# Patient Record
Sex: FEMALE | Race: WHITE | Employment: OTHER | URBAN - NONMETROPOLITAN AREA
[De-identification: names, ages, dates, MRNs, and addresses within clinical notes are randomized per-mention and may not be internally consistent; named-entity substitution may affect disease eponyms.]

---

## 2017-04-05 ENCOUNTER — DOCTOR'S OFFICE (OUTPATIENT)
Dept: URBAN - NONMETROPOLITAN AREA CLINIC 1 | Facility: CLINIC | Age: 82
Setting detail: OPHTHALMOLOGY
End: 2017-04-05
Payer: COMMERCIAL

## 2017-04-05 DIAGNOSIS — H35.09: ICD-10-CM

## 2017-04-05 DIAGNOSIS — H40.1132: ICD-10-CM

## 2017-04-05 DIAGNOSIS — Z96.1: ICD-10-CM

## 2017-04-05 PROCEDURE — 92250 FUNDUS PHOTOGRAPHY W/I&R: CPT | Performed by: OPHTHALMOLOGY

## 2017-04-05 PROCEDURE — 92014 COMPRE OPH EXAM EST PT 1/>: CPT | Performed by: OPHTHALMOLOGY

## 2017-04-05 PROCEDURE — 92133 CPTRZD OPH DX IMG PST SGM ON: CPT | Performed by: OPHTHALMOLOGY

## 2017-04-05 ASSESSMENT — REFRACTION_CURRENTRX
OS_ADD: +2.75
OS_VPRISM_DIRECTION: PROGS
OS_AXIS: 075
OS_OVR_VA: 20/
OD_CYLINDER: -0.50
OD_AXIS: 70
OS_SPHERE: PLANO
OS_OVR_VA: 20/
OD_VPRISM_DIRECTION: PROGS
OS_OVR_VA: 20/
OD_AXIS: 070
OS_ADD: +2.75
OS_CYLINDER: -*0.75
OD_OVR_VA: 20/
OD_SPHERE: PLANO
OS_VPRISM_DIRECTION: BF
OD_OVR_VA: 20/
OS_CYLINDER: -0.75
OD_CYLINDER: -0.75
OD_SPHERE: PLANO
OD_OVR_VA: 20/
OD_VPRISM_DIRECTION: BF
OS_AXIS: 65
OS_SPHERE: PLANO
OD_ADD: +2.75
OD_ADD: +2.75

## 2017-04-05 ASSESSMENT — CONFRONTATIONAL VISUAL FIELD TEST (CVF)
OS_FINDINGS: FULL
OD_FINDINGS: FULL

## 2017-04-05 ASSESSMENT — REFRACTION_MANIFEST
OD_SPHERE: PLANO
OS_VA1: 20/
OS_AXIS: 075
OD_VA2: 20/
OS_VA2: 20/
OD_VA3: 20/
OD_VA3: 20/
OD_CYLINDER: -0.75
OU_VA: 20/
OS_VA3: 20/
OU_VA: 20/
OS_VA2: 20/
OS_VA1: 20/25
OS_VA3: 20/
OD_VA2: 20/
OS_ADD: +2.75
OS_VA1: 20/
OS_VA3: 20/
OD_VA1: 20/25+2
OS_SPHERE: PLANO
OD_AXIS: 070
OD_VA1: 20/
OD_VA3: 20/
OS_VA2: 20/25
OD_VA1: 20/
OD_ADD: +2.75
OS_CYLINDER: -0.75
OU_VA: 20/
OD_VA2: 20/25+2

## 2017-04-05 ASSESSMENT — CORNEAL DYSTROPHY
OD_DYSTROPHY: BAND KERATOPATHY
OS_DYSTROPHY: BAND KERATOPATHY

## 2017-04-05 ASSESSMENT — REFRACTION_AUTOREFRACTION
OS_SPHERE: +1.50
OS_CYLINDER: -2.25
OS_AXIS: 63
OD_AXIS: 88
OD_CYLINDER: -1.25
OD_SPHERE: +0.50

## 2017-04-05 ASSESSMENT — SPHEQUIV_DERIVED
OD_SPHEQUIV: -0.125
OS_SPHEQUIV: 0.375

## 2017-04-05 ASSESSMENT — VISUAL ACUITY
OS_BCVA: 20/25
OD_BCVA: 20/30-2

## 2017-10-13 ENCOUNTER — DOCTOR'S OFFICE (OUTPATIENT)
Dept: URBAN - NONMETROPOLITAN AREA CLINIC 1 | Facility: CLINIC | Age: 82
Setting detail: OPHTHALMOLOGY
End: 2017-10-13
Payer: COMMERCIAL

## 2017-10-13 DIAGNOSIS — H35.09: ICD-10-CM

## 2017-10-13 DIAGNOSIS — H40.1132: ICD-10-CM

## 2017-10-13 DIAGNOSIS — Z96.1: ICD-10-CM

## 2017-10-13 PROCEDURE — 92134 CPTRZ OPH DX IMG PST SGM RTA: CPT | Performed by: OPHTHALMOLOGY

## 2017-10-13 PROCEDURE — 92083 EXTENDED VISUAL FIELD XM: CPT | Performed by: OPHTHALMOLOGY

## 2017-10-13 PROCEDURE — 92014 COMPRE OPH EXAM EST PT 1/>: CPT | Performed by: OPHTHALMOLOGY

## 2017-10-13 ASSESSMENT — REFRACTION_CURRENTRX
OD_SPHERE: PLANO
OD_VPRISM_DIRECTION: BF
OD_CYLINDER: -0.75
OS_SPHERE: PLANO
OS_SPHERE: PLANO
OS_VPRISM_DIRECTION: PROGS
OD_ADD: +2.75
OS_OVR_VA: 20/
OS_AXIS: 65
OD_AXIS: 070
OS_ADD: +2.75
OD_OVR_VA: 20/
OD_VPRISM_DIRECTION: PROGS
OD_OVR_VA: 20/
OS_OVR_VA: 20/
OD_CYLINDER: -0.50
OS_CYLINDER: -*0.75
OD_SPHERE: PLANO
OD_AXIS: 70
OD_ADD: +2.75
OS_CYLINDER: -0.75
OS_OVR_VA: 20/
OS_AXIS: 075
OS_ADD: +2.75
OD_OVR_VA: 20/
OS_VPRISM_DIRECTION: BF

## 2017-10-13 ASSESSMENT — REFRACTION_MANIFEST
OD_VA2: 20/25+2
OU_VA: 20/
OS_AXIS: 075
OU_VA: 20/
OS_VA3: 20/
OD_VA2: 20/
OS_VA2: 20/
OS_VA1: 20/25
OD_VA2: 20/
OS_VA1: 20/
OD_VA3: 20/
OS_VA2: 20/25
OU_VA: 20/
OD_CYLINDER: -0.75
OD_AXIS: 070
OD_VA1: 20/
OD_VA1: 20/25+2
OS_VA2: 20/
OD_SPHERE: PLANO
OD_VA3: 20/
OD_ADD: +2.75
OS_VA3: 20/
OS_CYLINDER: -0.75
OS_SPHERE: PLANO
OS_VA1: 20/
OS_VA3: 20/
OD_VA3: 20/
OD_VA1: 20/
OS_ADD: +2.75

## 2017-10-13 ASSESSMENT — VISUAL ACUITY
OS_BCVA: 20/25+2
OD_BCVA: 20/20-1

## 2017-10-13 ASSESSMENT — REFRACTION_AUTOREFRACTION
OD_AXIS: 048
OD_SPHERE: +0.75
OD_CYLINDER: -0.25
OS_SPHERE: +0.50
OS_CYLINDER: -0.25
OS_AXIS: 065

## 2017-10-13 ASSESSMENT — CORNEAL DYSTROPHY
OS_DYSTROPHY: BAND KERATOPATHY
OD_DYSTROPHY: BAND KERATOPATHY

## 2017-10-13 ASSESSMENT — CONFRONTATIONAL VISUAL FIELD TEST (CVF)
OD_FINDINGS: FULL
OS_FINDINGS: FULL

## 2017-10-13 ASSESSMENT — SPHEQUIV_DERIVED
OS_SPHEQUIV: 0.375
OD_SPHEQUIV: 0.625

## 2018-04-27 ENCOUNTER — DOCTOR'S OFFICE (OUTPATIENT)
Dept: URBAN - NONMETROPOLITAN AREA CLINIC 1 | Facility: CLINIC | Age: 83
Setting detail: OPHTHALMOLOGY
End: 2018-04-27
Payer: COMMERCIAL

## 2018-04-27 DIAGNOSIS — H35.09: ICD-10-CM

## 2018-04-27 DIAGNOSIS — Z96.1: ICD-10-CM

## 2018-04-27 DIAGNOSIS — E11.3293: ICD-10-CM

## 2018-04-27 DIAGNOSIS — H40.1132: ICD-10-CM

## 2018-04-27 DIAGNOSIS — T15.01XA: ICD-10-CM

## 2018-04-27 PROCEDURE — 92133 CPTRZD OPH DX IMG PST SGM ON: CPT | Performed by: OPHTHALMOLOGY

## 2018-04-27 PROCEDURE — 92014 COMPRE OPH EXAM EST PT 1/>: CPT | Performed by: OPHTHALMOLOGY

## 2018-04-27 PROCEDURE — 76514 ECHO EXAM OF EYE THICKNESS: CPT | Performed by: OPHTHALMOLOGY

## 2018-04-27 ASSESSMENT — SPHEQUIV_DERIVED
OS_SPHEQUIV: 0.375
OD_SPHEQUIV: 0.625

## 2018-04-27 ASSESSMENT — REFRACTION_CURRENTRX
OD_AXIS: 070
OS_OVR_VA: 20/
OD_ADD: +2.75
OS_CYLINDER: -0.75
OS_OVR_VA: 20/
OS_AXIS: 65
OS_AXIS: 075
OD_CYLINDER: -0.75
OD_ADD: +2.75
OD_CYLINDER: -0.50
OD_SPHERE: PLANO
OS_VPRISM_DIRECTION: BF
OS_ADD: +2.75
OS_SPHERE: PLANO
OS_CYLINDER: -*0.75
OD_SPHERE: PLANO
OS_ADD: +2.75
OD_OVR_VA: 20/
OS_OVR_VA: 20/
OD_OVR_VA: 20/
OD_VPRISM_DIRECTION: PROGS
OD_VPRISM_DIRECTION: BF
OS_SPHERE: PLANO
OS_VPRISM_DIRECTION: PROGS
OD_OVR_VA: 20/
OD_AXIS: 70

## 2018-04-27 ASSESSMENT — REFRACTION_MANIFEST
OD_VA3: 20/
OS_CYLINDER: -0.75
OS_VA1: 20/
OS_VA1: 20/
OD_AXIS: 070
OS_VA2: 20/
OS_VA3: 20/
OD_VA1: 20/25+2
OD_CYLINDER: -0.75
OD_VA1: 20/
OU_VA: 20/
OS_VA3: 20/
OS_VA3: 20/
OD_VA2: 20/
OU_VA: 20/
OD_SPHERE: PLANO
OS_ADD: +2.75
OD_VA3: 20/
OS_VA1: 20/25
OD_VA1: 20/
OS_VA2: 20/25
OS_SPHERE: PLANO
OD_ADD: +2.75
OD_VA2: 20/
OD_VA2: 20/25+2
OS_VA2: 20/
OD_VA3: 20/
OU_VA: 20/
OS_AXIS: 075

## 2018-04-27 ASSESSMENT — VISUAL ACUITY
OD_BCVA: 20/25-2
OS_BCVA: 20/25-2

## 2018-04-27 ASSESSMENT — CONFRONTATIONAL VISUAL FIELD TEST (CVF)
OS_FINDINGS: FULL
OD_FINDINGS: FULL

## 2018-04-27 ASSESSMENT — PACHYMETRY
OS_CT_UM: 472
OD_CT_UM: 469
OD_CT_CORRECTION: 6
OS_CT_CORRECTION: 5

## 2018-04-27 ASSESSMENT — REFRACTION_AUTOREFRACTION
OD_AXIS: 048
OS_AXIS: 065
OD_CYLINDER: -0.25
OD_SPHERE: +0.75
OS_SPHERE: +0.50
OS_CYLINDER: -0.25

## 2018-04-27 ASSESSMENT — CORNEAL DYSTROPHY
OD_DYSTROPHY: BAND KERATOPATHY
OS_DYSTROPHY: BAND KERATOPATHY

## 2018-08-22 ENCOUNTER — DOCTOR'S OFFICE (OUTPATIENT)
Dept: URBAN - NONMETROPOLITAN AREA CLINIC 1 | Facility: CLINIC | Age: 83
Setting detail: OPHTHALMOLOGY
End: 2018-08-22
Payer: COMMERCIAL

## 2018-08-22 DIAGNOSIS — E11.3293: ICD-10-CM

## 2018-08-22 DIAGNOSIS — H35.09: ICD-10-CM

## 2018-08-22 DIAGNOSIS — H40.1132: ICD-10-CM

## 2018-08-22 DIAGNOSIS — Z96.1: ICD-10-CM

## 2018-08-22 PROBLEM — T15.01XA FB CORNEA ; RIGHT EYE INITIAL ENCOUNTER: Status: RESOLVED | Noted: 2018-04-27 | Resolved: 2018-08-22

## 2018-08-22 PROCEDURE — 92014 COMPRE OPH EXAM EST PT 1/>: CPT | Performed by: OPHTHALMOLOGY

## 2018-08-22 PROCEDURE — 92250 FUNDUS PHOTOGRAPHY W/I&R: CPT | Performed by: OPHTHALMOLOGY

## 2018-08-22 ASSESSMENT — REFRACTION_MANIFEST
OU_VA: 20/
OS_VA3: 20/
OU_VA: 20/
OD_ADD: +2.75
OD_VA3: 20/
OD_CYLINDER: -0.75
OS_VA3: 20/
OS_SPHERE: PLANO
OS_VA1: 20/
OS_VA1: 20/25
OS_VA2: 20/25
OD_AXIS: 070
OD_VA1: 20/
OS_VA1: 20/
OS_VA3: 20/
OS_ADD: +2.75
OD_SPHERE: PLANO
OD_VA1: 20/
OD_VA2: 20/
OS_VA2: 20/
OD_VA3: 20/
OU_VA: 20/
OD_VA2: 20/
OS_CYLINDER: -0.75
OD_VA2: 20/25+2
OD_VA1: 20/25+2
OS_AXIS: 075
OS_VA2: 20/
OD_VA3: 20/

## 2018-08-22 ASSESSMENT — REFRACTION_CURRENTRX
OS_SPHERE: PLANO
OD_AXIS: 70
OD_CYLINDER: -0.75
OS_ADD: +2.75
OD_OVR_VA: 20/
OD_SPHERE: PLANO
OD_CYLINDER: -0.50
OD_ADD: +2.75
OS_AXIS: 075
OS_CYLINDER: -0.75
OS_SPHERE: PLANO
OS_OVR_VA: 20/
OD_OVR_VA: 20/
OS_CYLINDER: -*0.75
OD_VPRISM_DIRECTION: PROGS
OD_AXIS: 070
OD_SPHERE: PLANO
OS_VPRISM_DIRECTION: PROGS
OD_ADD: +2.75
OS_OVR_VA: 20/
OS_AXIS: 65
OD_VPRISM_DIRECTION: BF
OS_VPRISM_DIRECTION: BF
OS_OVR_VA: 20/
OS_ADD: +2.75
OD_OVR_VA: 20/

## 2018-08-22 ASSESSMENT — SPHEQUIV_DERIVED
OD_SPHEQUIV: 0.625
OS_SPHEQUIV: 0.375

## 2018-08-22 ASSESSMENT — REFRACTION_AUTOREFRACTION
OS_AXIS: 065
OD_SPHERE: +0.75
OS_CYLINDER: -0.25
OS_SPHERE: +0.50
OD_AXIS: 048
OD_CYLINDER: -0.25

## 2018-08-22 ASSESSMENT — CONFRONTATIONAL VISUAL FIELD TEST (CVF)
OD_FINDINGS: FULL
OS_FINDINGS: FULL

## 2018-08-22 ASSESSMENT — CORNEAL DYSTROPHY
OS_DYSTROPHY: BAND KERATOPATHY
OD_DYSTROPHY: BAND KERATOPATHY

## 2018-08-22 ASSESSMENT — VISUAL ACUITY
OD_BCVA: 20/30+2
OS_BCVA: 20/30-2

## 2019-04-10 ENCOUNTER — RX ONLY (RX ONLY)
Age: 84
End: 2019-04-10

## 2019-04-10 ENCOUNTER — DOCTOR'S OFFICE (OUTPATIENT)
Dept: URBAN - NONMETROPOLITAN AREA CLINIC 1 | Facility: CLINIC | Age: 84
Setting detail: OPHTHALMOLOGY
End: 2019-04-10
Payer: COMMERCIAL

## 2019-04-10 DIAGNOSIS — H35.09: ICD-10-CM

## 2019-04-10 DIAGNOSIS — E11.3293: ICD-10-CM

## 2019-04-10 DIAGNOSIS — H40.1132: ICD-10-CM

## 2019-04-10 DIAGNOSIS — Z96.1: ICD-10-CM

## 2019-04-10 DIAGNOSIS — H35.3132: ICD-10-CM

## 2019-04-10 PROCEDURE — 92083 EXTENDED VISUAL FIELD XM: CPT | Performed by: OPHTHALMOLOGY

## 2019-04-10 PROCEDURE — 92014 COMPRE OPH EXAM EST PT 1/>: CPT | Performed by: OPHTHALMOLOGY

## 2019-04-10 PROCEDURE — 92134 CPTRZ OPH DX IMG PST SGM RTA: CPT | Performed by: OPHTHALMOLOGY

## 2019-04-10 PROCEDURE — 76514 ECHO EXAM OF EYE THICKNESS: CPT | Performed by: OPHTHALMOLOGY

## 2019-04-10 ASSESSMENT — REFRACTION_MANIFEST
OD_SPHERE: PLANO
OU_VA: 20/
OS_AXIS: 075
OD_VA1: 20/
OS_VA3: 20/
OS_VA1: 20/
OD_CYLINDER: -0.75
OS_ADD: +2.75
OS_VA1: 20/25
OD_VA2: 20/25+2
OS_CYLINDER: -0.75
OD_AXIS: 070
OS_VA2: 20/
OD_VA2: 20/
OS_SPHERE: PLANO
OD_VA1: 20/25+2
OS_VA2: 20/25
OD_ADD: +2.75
OU_VA: 20/
OD_VA3: 20/
OS_VA3: 20/
OD_VA3: 20/

## 2019-04-10 ASSESSMENT — REFRACTION_CURRENTRX
OS_CYLINDER: -*0.75
OS_SPHERE: PLANO
OD_ADD: +3.00
OS_AXIS: 65
OD_OVR_VA: 20/
OD_OVR_VA: 20/
OS_VPRISM_DIRECTION: BF
OD_SPHERE: PLANO
OS_OVR_VA: 20/
OS_SPHERE: PLANO
OD_VPRISM_DIRECTION: PROGS
OD_VPRISM_DIRECTION: BF
OD_CYLINDER: -0.75
OS_AXIS: 075
OS_OVR_VA: 20/
OS_ADD: +3.00
OS_ADD: +2.75
OS_CYLINDER: -0.75
OD_OVR_VA: 20/
OD_AXIS: 71
OD_ADD: +2.75
OD_CYLINDER: -0.75
OS_OVR_VA: 20/
OS_VPRISM_DIRECTION: PROGS
OD_SPHERE: PLANO
OD_AXIS: 070

## 2019-04-10 ASSESSMENT — VISUAL ACUITY
OS_BCVA: 20/30+2
OD_BCVA: 20/30-2

## 2019-04-10 ASSESSMENT — REFRACTION_AUTOREFRACTION
OD_AXIS: 174
OD_CYLINDER: -1.00
OS_CYLINDER: -0.75
OS_AXIS: 35
OS_SPHERE: +0.50
OD_SPHERE: +0.25

## 2019-04-10 ASSESSMENT — PACHYMETRY
OS_CT_UM: 611
OS_CT_CORRECTION: -5
OD_CT_CORRECTION: -5
OD_CT_UM: 618

## 2019-04-10 ASSESSMENT — CONFRONTATIONAL VISUAL FIELD TEST (CVF)
OS_FINDINGS: FULL
OD_FINDINGS: FULL

## 2019-04-10 ASSESSMENT — CORNEAL DYSTROPHY
OD_DYSTROPHY: BAND KERATOPATHY
OS_DYSTROPHY: BAND KERATOPATHY

## 2019-04-10 ASSESSMENT — SPHEQUIV_DERIVED
OD_SPHEQUIV: -0.25
OS_SPHEQUIV: 0.125

## 2019-04-26 ENCOUNTER — DOCTOR'S OFFICE (OUTPATIENT)
Dept: URBAN - NONMETROPOLITAN AREA CLINIC 1 | Facility: CLINIC | Age: 84
Setting detail: OPHTHALMOLOGY
End: 2019-04-26
Payer: COMMERCIAL

## 2019-04-26 DIAGNOSIS — H02.051: ICD-10-CM

## 2019-04-26 DIAGNOSIS — H10.503: ICD-10-CM

## 2019-04-26 DIAGNOSIS — H40.1132: ICD-10-CM

## 2019-04-26 DIAGNOSIS — H02.052: ICD-10-CM

## 2019-04-26 PROCEDURE — 67820 REVISE EYELASHES: CPT | Performed by: OPHTHALMOLOGY

## 2019-04-26 PROCEDURE — 92012 INTRM OPH EXAM EST PATIENT: CPT | Performed by: OPHTHALMOLOGY

## 2019-04-26 ASSESSMENT — REFRACTION_MANIFEST
OS_ADD: +2.75
OS_VA3: 20/
OD_AXIS: 070
OU_VA: 20/
OD_VA2: 20/
OD_VA1: 20/25+2
OS_VA2: 20/25
OD_VA3: 20/
OS_VA2: 20/
OU_VA: 20/
OS_SPHERE: PLANO
OS_VA1: 20/
OD_VA3: 20/
OS_VA1: 20/25
OD_ADD: +2.75
OD_VA1: 20/
OS_AXIS: 075
OD_CYLINDER: -0.75
OD_SPHERE: PLANO
OS_VA3: 20/
OS_CYLINDER: -0.75
OD_VA2: 20/25+2

## 2019-04-26 ASSESSMENT — REFRACTION_CURRENTRX
OD_ADD: +3.00
OD_AXIS: 71
OD_ADD: +2.75
OS_ADD: +3.00
OS_OVR_VA: 20/
OD_OVR_VA: 20/
OD_SPHERE: PLANO
OD_CYLINDER: -0.75
OS_SPHERE: PLANO
OS_OVR_VA: 20/
OD_OVR_VA: 20/
OD_SPHERE: PLANO
OS_VPRISM_DIRECTION: BF
OD_OVR_VA: 20/
OS_CYLINDER: -0.75
OD_CYLINDER: -0.75
OD_AXIS: 070
OD_VPRISM_DIRECTION: PROGS
OS_OVR_VA: 20/
OS_VPRISM_DIRECTION: PROGS
OS_AXIS: 075
OS_CYLINDER: -*0.75
OS_SPHERE: PLANO
OD_VPRISM_DIRECTION: BF
OS_ADD: +2.75
OS_AXIS: 65

## 2019-04-26 ASSESSMENT — CONFRONTATIONAL VISUAL FIELD TEST (CVF)
OD_FINDINGS: FULL
OS_FINDINGS: FULL

## 2019-04-26 ASSESSMENT — REFRACTION_AUTOREFRACTION
OD_SPHERE: +0.25
OS_CYLINDER: -0.75
OD_CYLINDER: -1.00
OS_AXIS: 35
OD_AXIS: 174
OS_SPHERE: +0.50

## 2019-04-26 ASSESSMENT — VISUAL ACUITY
OS_BCVA: 20/30-1
OD_BCVA: 20/30-2

## 2019-04-26 ASSESSMENT — SPHEQUIV_DERIVED
OD_SPHEQUIV: -0.25
OS_SPHEQUIV: 0.125

## 2019-04-26 ASSESSMENT — CORNEAL DYSTROPHY
OS_DYSTROPHY: BAND KERATOPATHY
OD_DYSTROPHY: BAND KERATOPATHY

## 2019-04-26 ASSESSMENT — LID EXAM ASSESSMENTS: OD_TRICHIASIS: RLL RUL 1+

## 2019-05-01 ENCOUNTER — RX ONLY (RX ONLY)
Age: 84
End: 2019-05-01

## 2019-05-01 ENCOUNTER — DOCTOR'S OFFICE (OUTPATIENT)
Dept: URBAN - NONMETROPOLITAN AREA CLINIC 1 | Facility: CLINIC | Age: 84
Setting detail: OPHTHALMOLOGY
End: 2019-05-01
Payer: COMMERCIAL

## 2019-05-01 DIAGNOSIS — H10.503: ICD-10-CM

## 2019-05-01 DIAGNOSIS — B02.39: ICD-10-CM

## 2019-05-01 PROCEDURE — 99214 OFFICE O/P EST MOD 30 MIN: CPT | Performed by: PHYSICIAN ASSISTANT

## 2019-05-01 ASSESSMENT — LID EXAM ASSESSMENTS: OD_BLEPHARITIS: RLL RUL T

## 2019-05-01 ASSESSMENT — CORNEAL DYSTROPHY
OD_DYSTROPHY: BAND KERATOPATHY
OS_DYSTROPHY: BAND KERATOPATHY

## 2019-05-03 ENCOUNTER — DOCTOR'S OFFICE (OUTPATIENT)
Dept: URBAN - NONMETROPOLITAN AREA CLINIC 1 | Facility: CLINIC | Age: 84
Setting detail: OPHTHALMOLOGY
End: 2019-05-03
Payer: COMMERCIAL

## 2019-05-03 DIAGNOSIS — B02.39: ICD-10-CM

## 2019-05-03 PROCEDURE — 99214 OFFICE O/P EST MOD 30 MIN: CPT | Performed by: PHYSICIAN ASSISTANT

## 2019-05-03 ASSESSMENT — REFRACTION_CURRENTRX
OS_AXIS: 075
OD_SPHERE: PLANO
OD_VPRISM_DIRECTION: PROGS
OD_SPHERE: PLANO
OD_CYLINDER: -0.75
OS_ADD: +2.75
OD_OVR_VA: 20/
OS_VPRISM_DIRECTION: PROGS
OD_ADD: +2.75
OS_OVR_VA: 20/
OS_SPHERE: PLANO
OD_ADD: +3.00
OD_AXIS: 070
OD_VPRISM_DIRECTION: BF
OS_AXIS: 65
OS_VPRISM_DIRECTION: BF
OS_OVR_VA: 20/
OD_AXIS: 71
OS_CYLINDER: -0.75
OS_ADD: +3.00
OD_CYLINDER: -0.75
OS_OVR_VA: 20/
OS_CYLINDER: -*0.75
OD_OVR_VA: 20/
OS_SPHERE: PLANO
OD_OVR_VA: 20/

## 2019-05-03 ASSESSMENT — REFRACTION_MANIFEST
OD_CYLINDER: -0.75
OS_AXIS: 075
OS_ADD: +2.75
OS_VA2: 20/25
OD_VA1: 20/
OS_VA3: 20/
OD_ADD: +2.75
OU_VA: 20/
OS_VA1: 20/25
OD_VA3: 20/
OS_VA1: 20/
OD_VA2: 20/25+2
OS_VA3: 20/
OS_SPHERE: PLANO
OU_VA: 20/
OD_VA1: 20/25+2
OS_CYLINDER: -0.75
OD_SPHERE: PLANO
OS_VA2: 20/
OD_AXIS: 070
OD_VA3: 20/
OD_VA2: 20/

## 2019-05-03 ASSESSMENT — REFRACTION_AUTOREFRACTION
OD_SPHERE: +0.25
OS_SPHERE: +0.50
OS_CYLINDER: -0.75
OD_AXIS: 174
OS_AXIS: 35
OD_CYLINDER: -1.00

## 2019-05-03 ASSESSMENT — SPHEQUIV_DERIVED
OD_SPHEQUIV: -0.25
OS_SPHEQUIV: 0.125

## 2019-05-03 ASSESSMENT — CONFRONTATIONAL VISUAL FIELD TEST (CVF)
OD_FINDINGS: FULL
OS_FINDINGS: FULL

## 2019-05-03 ASSESSMENT — VISUAL ACUITY
OD_BCVA: 20/30-2
OS_BCVA: 20/30-2

## 2019-05-03 ASSESSMENT — CORNEAL DYSTROPHY
OD_DYSTROPHY: BAND KERATOPATHY
OS_DYSTROPHY: BAND KERATOPATHY

## 2019-05-03 ASSESSMENT — LID EXAM ASSESSMENTS: OD_BLEPHARITIS: RLL RUL T

## 2019-05-06 ASSESSMENT — REFRACTION_CURRENTRX
OD_SPHERE: PLANO
OS_OVR_VA: 20/
OS_ADD: +3.00
OD_OVR_VA: 20/
OD_ADD: +2.75
OS_OVR_VA: 20/
OD_OVR_VA: 20/
OD_AXIS: 71
OD_AXIS: 070
OS_SPHERE: PLANO
OD_SPHERE: PLANO
OS_VPRISM_DIRECTION: PROGS
OS_CYLINDER: -*0.75
OD_VPRISM_DIRECTION: PROGS
OS_VPRISM_DIRECTION: BF
OD_OVR_VA: 20/
OD_ADD: +3.00
OS_AXIS: 65
OS_AXIS: 075
OD_CYLINDER: -0.75
OD_CYLINDER: -0.75
OS_CYLINDER: -0.75
OS_SPHERE: PLANO
OD_VPRISM_DIRECTION: BF
OS_OVR_VA: 20/
OS_ADD: +2.75

## 2019-05-06 ASSESSMENT — REFRACTION_MANIFEST
OS_VA1: 20/
OS_SPHERE: PLANO
OS_VA3: 20/
OD_VA2: 20/
OS_VA1: 20/25
OD_VA1: 20/
OD_VA2: 20/25+2
OD_VA1: 20/25+2
OD_ADD: +2.75
OS_VA3: 20/
OU_VA: 20/
OD_VA3: 20/
OD_AXIS: 070
OD_CYLINDER: -0.75
OS_ADD: +2.75
OD_VA3: 20/
OU_VA: 20/
OS_CYLINDER: -0.75
OS_VA2: 20/25
OD_SPHERE: PLANO
OS_VA2: 20/
OS_AXIS: 075

## 2019-05-06 ASSESSMENT — VISUAL ACUITY
OD_BCVA: 20/25-1
OS_BCVA: 20/40

## 2019-05-06 ASSESSMENT — SPHEQUIV_DERIVED
OS_SPHEQUIV: 0.125
OD_SPHEQUIV: -0.25

## 2019-05-06 ASSESSMENT — REFRACTION_AUTOREFRACTION
OD_SPHERE: +0.25
OS_CYLINDER: -0.75
OD_CYLINDER: -1.00
OS_SPHERE: +0.50
OD_AXIS: 174
OS_AXIS: 35

## 2019-05-07 ENCOUNTER — DOCTOR'S OFFICE (OUTPATIENT)
Dept: URBAN - NONMETROPOLITAN AREA CLINIC 1 | Facility: CLINIC | Age: 84
Setting detail: OPHTHALMOLOGY
End: 2019-05-07
Payer: COMMERCIAL

## 2019-05-07 ENCOUNTER — RX ONLY (RX ONLY)
Age: 84
End: 2019-05-07

## 2019-05-07 DIAGNOSIS — B02.39: ICD-10-CM

## 2019-05-07 PROCEDURE — 99214 OFFICE O/P EST MOD 30 MIN: CPT | Performed by: PHYSICIAN ASSISTANT

## 2019-05-07 ASSESSMENT — LID EXAM ASSESSMENTS: OD_BLEPHARITIS: RLL RUL T

## 2019-05-07 ASSESSMENT — CORNEAL DYSTROPHY
OS_DYSTROPHY: BAND KERATOPATHY
OD_DYSTROPHY: BAND KERATOPATHY

## 2019-05-08 ASSESSMENT — REFRACTION_MANIFEST
OS_VA2: 20/25
OU_VA: 20/
OU_VA: 20/
OS_AXIS: 075
OD_VA3: 20/
OD_VA3: 20/
OD_ADD: +2.75
OD_AXIS: 070
OD_VA1: 20/25+2
OD_VA2: 20/25+2
OS_VA2: 20/
OD_CYLINDER: -0.75
OS_ADD: +2.75
OS_VA3: 20/
OS_CYLINDER: -0.75
OD_VA2: 20/
OS_VA3: 20/
OD_VA1: 20/
OS_SPHERE: PLANO
OD_SPHERE: PLANO
OS_VA1: 20/
OS_VA1: 20/25

## 2019-05-08 ASSESSMENT — REFRACTION_AUTOREFRACTION
OS_CYLINDER: -0.75
OD_AXIS: 174
OD_CYLINDER: -1.00
OS_AXIS: 35
OS_SPHERE: +0.50
OD_SPHERE: +0.25

## 2019-05-08 ASSESSMENT — REFRACTION_CURRENTRX
OD_CYLINDER: -0.75
OD_AXIS: 71
OD_OVR_VA: 20/
OD_VPRISM_DIRECTION: PROGS
OS_ADD: +2.75
OS_ADD: +3.00
OD_AXIS: 070
OS_AXIS: 65
OS_AXIS: 075
OS_VPRISM_DIRECTION: BF
OD_SPHERE: PLANO
OS_SPHERE: PLANO
OS_OVR_VA: 20/
OD_OVR_VA: 20/
OS_CYLINDER: -0.75
OD_ADD: +3.00
OS_OVR_VA: 20/
OS_SPHERE: PLANO
OD_VPRISM_DIRECTION: BF
OS_OVR_VA: 20/
OD_OVR_VA: 20/
OD_ADD: +2.75
OS_CYLINDER: -*0.75
OD_SPHERE: PLANO
OS_VPRISM_DIRECTION: PROGS
OD_CYLINDER: -0.75

## 2019-05-08 ASSESSMENT — VISUAL ACUITY
OS_BCVA: 20/30-2
OD_BCVA: 20/25-1

## 2019-05-08 ASSESSMENT — SPHEQUIV_DERIVED
OS_SPHEQUIV: 0.125
OD_SPHEQUIV: -0.25

## 2019-05-21 ENCOUNTER — DOCTOR'S OFFICE (OUTPATIENT)
Dept: URBAN - NONMETROPOLITAN AREA CLINIC 1 | Facility: CLINIC | Age: 84
Setting detail: OPHTHALMOLOGY
End: 2019-05-21
Payer: COMMERCIAL

## 2019-05-21 DIAGNOSIS — B02.39: ICD-10-CM

## 2019-05-21 PROCEDURE — 99214 OFFICE O/P EST MOD 30 MIN: CPT | Performed by: PHYSICIAN ASSISTANT

## 2019-05-21 ASSESSMENT — CONFRONTATIONAL VISUAL FIELD TEST (CVF)
OS_FINDINGS: FULL
OD_FINDINGS: FULL

## 2019-05-21 ASSESSMENT — DRY EYES - PHYSICIAN NOTES: OD_GENERALCOMMENTS: INFERIOR

## 2019-05-21 ASSESSMENT — CORNEAL DYSTROPHY
OS_DYSTROPHY: BAND KERATOPATHY
OD_DYSTROPHY: BAND KERATOPATHY

## 2019-05-21 ASSESSMENT — SUPERFICIAL PUNCTATE KERATITIS (SPK): OD_SPK: T

## 2019-05-21 ASSESSMENT — LID EXAM ASSESSMENTS: OD_BLEPHARITIS: RLL RUL T

## 2019-05-24 ASSESSMENT — REFRACTION_MANIFEST
OS_VA1: 20/25
OD_AXIS: 070
OU_VA: 20/
OU_VA: 20/
OS_VA3: 20/
OS_VA2: 20/25
OS_VA2: 20/
OS_VA1: 20/
OD_VA2: 20/25+2
OS_AXIS: 075
OD_CYLINDER: -0.75
OS_SPHERE: PLANO
OD_VA3: 20/
OD_SPHERE: PLANO
OD_VA1: 20/
OD_VA2: 20/
OS_VA3: 20/
OD_VA3: 20/
OD_ADD: +2.75
OS_ADD: +2.75
OD_VA1: 20/25+2
OS_CYLINDER: -0.75

## 2019-05-24 ASSESSMENT — REFRACTION_CURRENTRX
OD_OVR_VA: 20/
OD_VPRISM_DIRECTION: PROGS
OS_CYLINDER: -*0.75
OS_OVR_VA: 20/
OS_ADD: +3.00
OD_CYLINDER: -0.75
OS_AXIS: 65
OD_AXIS: 070
OD_SPHERE: PLANO
OS_ADD: +2.75
OS_CYLINDER: -0.75
OS_VPRISM_DIRECTION: PROGS
OD_VPRISM_DIRECTION: BF
OD_OVR_VA: 20/
OD_CYLINDER: -0.75
OD_OVR_VA: 20/
OD_ADD: +3.00
OS_VPRISM_DIRECTION: BF
OS_OVR_VA: 20/
OD_SPHERE: PLANO
OD_ADD: +2.75
OS_SPHERE: PLANO
OS_SPHERE: PLANO
OS_AXIS: 075
OS_OVR_VA: 20/
OD_AXIS: 71

## 2019-05-24 ASSESSMENT — REFRACTION_AUTOREFRACTION
OD_AXIS: 174
OS_AXIS: 35
OS_CYLINDER: -0.75
OS_SPHERE: +0.50
OD_SPHERE: +0.25
OD_CYLINDER: -1.00

## 2019-05-24 ASSESSMENT — SPHEQUIV_DERIVED
OD_SPHEQUIV: -0.25
OS_SPHEQUIV: 0.125

## 2019-05-24 ASSESSMENT — VISUAL ACUITY
OD_BCVA: 20/25-1
OS_BCVA: 20/30+2

## 2019-10-16 ENCOUNTER — DOCTOR'S OFFICE (OUTPATIENT)
Dept: URBAN - NONMETROPOLITAN AREA CLINIC 1 | Facility: CLINIC | Age: 84
Setting detail: OPHTHALMOLOGY
End: 2019-10-16
Payer: COMMERCIAL

## 2019-10-16 DIAGNOSIS — H40.1132: ICD-10-CM

## 2019-10-16 DIAGNOSIS — Z96.1: ICD-10-CM

## 2019-10-16 DIAGNOSIS — B02.39: ICD-10-CM

## 2019-10-16 DIAGNOSIS — H35.3132: ICD-10-CM

## 2019-10-16 DIAGNOSIS — H35.09: ICD-10-CM

## 2019-10-16 DIAGNOSIS — E11.3293: ICD-10-CM

## 2019-10-16 DIAGNOSIS — H11.32: ICD-10-CM

## 2019-10-16 PROBLEM — H02.052 TRICHIASIS WITHOUT ENTROPION; RIGHT UPPER LID, RIGHT LOWER LID: Status: RESOLVED | Noted: 2019-04-26 | Resolved: 2019-10-16

## 2019-10-16 PROBLEM — H02.051 TRICHIASIS WITHOUT ENTROPION; RIGHT UPPER LID, RIGHT LOWER LID: Status: RESOLVED | Noted: 2019-04-26 | Resolved: 2019-10-16

## 2019-10-16 PROBLEM — H10.503 BLEPHAROCONUNCTIVITIS, UNSPECIFIED; BOTH EYES: Status: RESOLVED | Noted: 2019-04-26 | Resolved: 2019-10-16

## 2019-10-16 PROCEDURE — 92014 COMPRE OPH EXAM EST PT 1/>: CPT | Performed by: OPHTHALMOLOGY

## 2019-10-16 PROCEDURE — 92133 CPTRZD OPH DX IMG PST SGM ON: CPT | Performed by: OPHTHALMOLOGY

## 2019-10-16 ASSESSMENT — REFRACTION_CURRENTRX
OS_ADD: +3.00
OS_ADD: +2.75
OD_ADD: +2.75
OD_OVR_VA: 20/
OS_OVR_VA: 20/
OS_AXIS: 075
OD_VPRISM_DIRECTION: PROGS
OD_OVR_VA: 20/
OD_SPHERE: PLANO
OD_SPHERE: -0.25
OS_SPHERE: PLANO
OS_CYLINDER: -*0.75
OD_AXIS: 070
OS_VPRISM_DIRECTION: BF
OS_VPRISM_DIRECTION: PROGS
OS_OVR_VA: 20/
OS_SPHERE: -0.25
OD_ADD: +3.00
OD_VPRISM_DIRECTION: BF
OS_AXIS: 109
OD_CYLINDER: -0.75
OD_CYLINDER: -0.50
OD_OVR_VA: 20/
OD_AXIS: 065
OS_CYLINDER: -0.25
OS_OVR_VA: 20/

## 2019-10-16 ASSESSMENT — REFRACTION_MANIFEST
OD_AXIS: 070
OD_ADD: +2.75
OD_CYLINDER: -0.75
OD_VA3: 20/
OD_VA2: 20/
OS_VA1: 20/
OS_VA2: 20/25
OD_VA1: 20/25+2
OD_VA2: 20/25+2
OS_ADD: +2.75
OS_VA3: 20/
OS_AXIS: 075
OD_SPHERE: PLANO
OS_VA3: 20/
OS_VA1: 20/25
OD_VA1: 20/
OU_VA: 20/
OU_VA: 20/
OS_VA2: 20/
OD_VA3: 20/
OS_SPHERE: PLANO
OS_CYLINDER: -0.75

## 2019-10-16 ASSESSMENT — REFRACTION_AUTOREFRACTION
OD_CYLINDER: -0.50
OD_SPHERE: +0.25
OD_AXIS: 002
OS_SPHERE: +0.50
OS_CYLINDER: -0.75
OS_AXIS: 35

## 2019-10-16 ASSESSMENT — DRY EYES - PHYSICIAN NOTES: OD_GENERALCOMMENTS: INFERIOR

## 2019-10-16 ASSESSMENT — SPHEQUIV_DERIVED
OD_SPHEQUIV: 0
OS_SPHEQUIV: 0.125

## 2019-10-16 ASSESSMENT — CORNEAL DYSTROPHY
OS_DYSTROPHY: BAND KERATOPATHY
OD_DYSTROPHY: BAND KERATOPATHY

## 2019-10-16 ASSESSMENT — CONFRONTATIONAL VISUAL FIELD TEST (CVF)
OD_FINDINGS: FULL
OS_FINDINGS: FULL

## 2019-10-16 ASSESSMENT — LID EXAM ASSESSMENTS: OD_BLEPHARITIS: RLL RUL T

## 2019-10-16 ASSESSMENT — SUPERFICIAL PUNCTATE KERATITIS (SPK): OD_SPK: T

## 2019-10-16 ASSESSMENT — VISUAL ACUITY
OD_BCVA: 20/40+2
OS_BCVA: 20/40+2

## 2020-06-04 ENCOUNTER — APPOINTMENT (EMERGENCY)
Dept: RADIOLOGY | Facility: HOSPITAL | Age: 85
End: 2020-06-04
Payer: MEDICARE

## 2020-06-04 ENCOUNTER — HOSPITAL ENCOUNTER (EMERGENCY)
Facility: HOSPITAL | Age: 85
Discharge: HOME | End: 2020-06-04
Attending: EMERGENCY MEDICINE | Admitting: EMERGENCY MEDICINE
Payer: MEDICARE

## 2020-06-04 VITALS
SYSTOLIC BLOOD PRESSURE: 162 MMHG | WEIGHT: 167.77 LBS | DIASTOLIC BLOOD PRESSURE: 70 MMHG | RESPIRATION RATE: 23 BRPM | OXYGEN SATURATION: 97 % | HEART RATE: 76 BPM | TEMPERATURE: 98.7 F

## 2020-06-04 DIAGNOSIS — R06.02 SOB (SHORTNESS OF BREATH): Primary | ICD-10-CM

## 2020-06-04 LAB
ALBUMIN SERPL BCP-MCNC: 3.2 G/DL (ref 3.5–5)
ALP SERPL-CCNC: 102 U/L (ref 46–116)
ALT SERPL W P-5'-P-CCNC: 16 U/L (ref 12–78)
ANION GAP SERPL CALCULATED.3IONS-SCNC: 12 MMOL/L (ref 4–13)
AST SERPL W P-5'-P-CCNC: 18 U/L (ref 5–45)
BASOPHILS # BLD AUTO: 0.04 THOUSANDS/ΜL (ref 0–0.1)
BASOPHILS NFR BLD AUTO: 1 % (ref 0–1)
BILIRUB SERPL-MCNC: 0.42 MG/DL (ref 0.2–1)
BUN SERPL-MCNC: 26 MG/DL (ref 5–25)
CALCIUM SERPL-MCNC: 9.1 MG/DL (ref 8.3–10.1)
CHLORIDE SERPL-SCNC: 107 MMOL/L (ref 100–108)
CO2 SERPL-SCNC: 24 MMOL/L (ref 21–32)
CREAT SERPL-MCNC: 1.89 MG/DL (ref 0.6–1.3)
EOSINOPHIL # BLD AUTO: 0.14 THOUSAND/ΜL (ref 0–0.61)
EOSINOPHIL NFR BLD AUTO: 2 % (ref 0–6)
ERYTHROCYTE [DISTWIDTH] IN BLOOD BY AUTOMATED COUNT: 12.6 % (ref 11.6–15.1)
GFR SERPL CREATININE-BSD FRML MDRD: 24 ML/MIN/1.73SQ M
GLUCOSE SERPL-MCNC: 96 MG/DL (ref 65–140)
HCT VFR BLD AUTO: 32.6 % (ref 34.8–46.1)
HGB BLD-MCNC: 10.2 G/DL (ref 11.5–15.4)
IMM GRANULOCYTES # BLD AUTO: 0.01 THOUSAND/UL (ref 0–0.2)
IMM GRANULOCYTES NFR BLD AUTO: 0 % (ref 0–2)
LACTATE SERPL-SCNC: 2.3 MMOL/L (ref 0.5–2)
LIPASE SERPL-CCNC: 146 U/L (ref 73–393)
LYMPHOCYTES # BLD AUTO: 1.68 THOUSANDS/ΜL (ref 0.6–4.47)
LYMPHOCYTES NFR BLD AUTO: 28 % (ref 14–44)
MCH RBC QN AUTO: 30.5 PG (ref 26.8–34.3)
MCHC RBC AUTO-ENTMCNC: 31.3 G/DL (ref 31.4–37.4)
MCV RBC AUTO: 98 FL (ref 82–98)
MONOCYTES # BLD AUTO: 0.61 THOUSAND/ΜL (ref 0.17–1.22)
MONOCYTES NFR BLD AUTO: 10 % (ref 4–12)
NEUTROPHILS # BLD AUTO: 3.43 THOUSANDS/ΜL (ref 1.85–7.62)
NEUTS SEG NFR BLD AUTO: 59 % (ref 43–75)
NRBC BLD AUTO-RTO: 0 /100 WBCS
PLATELET # BLD AUTO: 139 THOUSANDS/UL (ref 149–390)
PMV BLD AUTO: 13 FL (ref 8.9–12.7)
POTASSIUM SERPL-SCNC: 3.8 MMOL/L (ref 3.5–5.3)
PROT SERPL-MCNC: 7.4 G/DL (ref 6.4–8.2)
RBC # BLD AUTO: 3.34 MILLION/UL (ref 3.81–5.12)
SARS-COV-2 RNA RESP QL NAA+PROBE: NEGATIVE
SODIUM SERPL-SCNC: 143 MMOL/L (ref 136–145)
TROPONIN I SERPL-MCNC: <0.02 NG/ML
WBC # BLD AUTO: 5.91 THOUSAND/UL (ref 4.31–10.16)

## 2020-06-04 PROCEDURE — 83690 ASSAY OF LIPASE: CPT | Performed by: PHYSICIAN ASSISTANT

## 2020-06-04 PROCEDURE — 87635 SARS-COV-2 COVID-19 AMP PRB: CPT | Performed by: PHYSICIAN ASSISTANT

## 2020-06-04 PROCEDURE — 99285 EMERGENCY DEPT VISIT HI MDM: CPT | Performed by: EMERGENCY MEDICINE

## 2020-06-04 PROCEDURE — 80053 COMPREHEN METABOLIC PANEL: CPT | Performed by: PHYSICIAN ASSISTANT

## 2020-06-04 PROCEDURE — 84484 ASSAY OF TROPONIN QUANT: CPT | Performed by: PHYSICIAN ASSISTANT

## 2020-06-04 PROCEDURE — 71045 X-RAY EXAM CHEST 1 VIEW: CPT

## 2020-06-04 PROCEDURE — 99285 EMERGENCY DEPT VISIT HI MDM: CPT

## 2020-06-04 PROCEDURE — 36415 COLL VENOUS BLD VENIPUNCTURE: CPT | Performed by: PHYSICIAN ASSISTANT

## 2020-06-04 PROCEDURE — 85025 COMPLETE CBC W/AUTO DIFF WBC: CPT | Performed by: PHYSICIAN ASSISTANT

## 2020-06-04 PROCEDURE — 96360 HYDRATION IV INFUSION INIT: CPT

## 2020-06-04 PROCEDURE — 83605 ASSAY OF LACTIC ACID: CPT | Performed by: PHYSICIAN ASSISTANT

## 2020-06-04 PROCEDURE — 93005 ELECTROCARDIOGRAM TRACING: CPT

## 2020-06-04 RX ORDER — FAMOTIDINE 20 MG/1
20 TABLET, FILM COATED ORAL DAILY
COMMUNITY
Start: 2019-12-17

## 2020-06-04 RX ORDER — AMLODIPINE BESYLATE 5 MG/1
5 TABLET ORAL DAILY
COMMUNITY
Start: 2018-12-05

## 2020-06-04 RX ORDER — LEVOTHYROXINE SODIUM 0.05 MG/1
50 TABLET ORAL DAILY
COMMUNITY
Start: 2019-04-01

## 2020-06-04 RX ORDER — PRAVASTATIN SODIUM 40 MG
40 TABLET ORAL
COMMUNITY
Start: 2019-04-08

## 2020-06-04 RX ORDER — LANOLIN ALCOHOL/MO/W.PET/CERES
1000 CREAM (GRAM) TOPICAL
COMMUNITY

## 2020-06-04 RX ORDER — UREA 10 %
1 LOTION (ML) TOPICAL
COMMUNITY

## 2020-06-04 RX ORDER — SODIUM BICARBONATE 650 MG/1
650 TABLET ORAL 2 TIMES DAILY
COMMUNITY
Start: 2020-04-08

## 2020-06-04 RX ORDER — FOLIC ACID 1 MG/1
1 TABLET ORAL DAILY
COMMUNITY
Start: 2020-03-30

## 2020-06-04 RX ORDER — MEMANTINE HYDROCHLORIDE 10 MG/1
10 TABLET ORAL 2 TIMES DAILY
COMMUNITY
Start: 2019-11-27

## 2020-06-04 RX ADMIN — SODIUM CHLORIDE 500 ML: 0.9 INJECTION, SOLUTION INTRAVENOUS at 18:59

## 2020-06-05 LAB
ATRIAL RATE: 81 BPM
P AXIS: 54 DEGREES
PR INTERVAL: 150 MS
QRS AXIS: 58 DEGREES
QRSD INTERVAL: 72 MS
QT INTERVAL: 374 MS
QTC INTERVAL: 434 MS
T WAVE AXIS: 159 DEGREES
VENTRICULAR RATE: 81 BPM

## 2020-08-06 ENCOUNTER — TRANSCRIBE ORDERS (OUTPATIENT)
Dept: ADMINISTRATIVE | Facility: HOSPITAL | Age: 85
End: 2020-08-06

## 2020-08-06 DIAGNOSIS — E04.2 NONTOXIC MULTINODULAR GOITER: Primary | ICD-10-CM

## 2020-08-11 ENCOUNTER — DOCTOR'S OFFICE (OUTPATIENT)
Dept: URBAN - NONMETROPOLITAN AREA CLINIC 1 | Facility: CLINIC | Age: 85
Setting detail: OPHTHALMOLOGY
End: 2020-08-11
Payer: COMMERCIAL

## 2020-08-11 VITALS — HEIGHT: 55 IN

## 2020-08-11 DIAGNOSIS — Z96.1: ICD-10-CM

## 2020-08-11 DIAGNOSIS — H35.3132: ICD-10-CM

## 2020-08-11 DIAGNOSIS — E11.3293: ICD-10-CM

## 2020-08-11 DIAGNOSIS — H40.1132: ICD-10-CM

## 2020-08-11 PROBLEM — B02.39: Status: RESOLVED | Noted: 2019-05-01 | Resolved: 2020-08-11

## 2020-08-11 PROBLEM — H11.32: Status: RESOLVED | Noted: 2019-10-16 | Resolved: 2020-08-11

## 2020-08-11 PROCEDURE — 92134 CPTRZ OPH DX IMG PST SGM RTA: CPT | Performed by: OPHTHALMOLOGY

## 2020-08-11 PROCEDURE — 99214 OFFICE O/P EST MOD 30 MIN: CPT | Performed by: OPHTHALMOLOGY

## 2020-08-11 PROCEDURE — 92083 EXTENDED VISUAL FIELD XM: CPT | Performed by: OPHTHALMOLOGY

## 2020-08-11 PROCEDURE — 76514 ECHO EXAM OF EYE THICKNESS: CPT | Performed by: OPHTHALMOLOGY

## 2020-08-11 ASSESSMENT — REFRACTION_CURRENTRX
OS_SPHERE: PLANO
OS_OVR_VA: 20/
OS_VPRISM_DIRECTION: PROGS
OS_OVR_VA: 20/
OS_CYLINDER: -0.25
OS_AXIS: 075
OS_CYLINDER: -*0.75
OD_AXIS: 070
OD_ADD: +2.75
OD_CYLINDER: -0.75
OD_VPRISM_DIRECTION: PROGS
OD_ADD: +3.00
OS_ADD: +2.75
OS_AXIS: 109
OD_VPRISM_DIRECTION: BF
OD_CYLINDER: -0.50
OS_ADD: +3.00
OD_OVR_VA: 20/
OD_SPHERE: PLANO
OD_AXIS: 065
OD_OVR_VA: 20/
OD_SPHERE: -0.25
OS_VPRISM_DIRECTION: BF
OS_SPHERE: -0.25

## 2020-08-11 ASSESSMENT — REFRACTION_MANIFEST
OD_CYLINDER: -0.75
OS_CYLINDER: -0.75
OS_SPHERE: PLANO
OD_VA1: 20/25+2
OS_VA2: 20/25
OS_ADD: +2.75
OS_AXIS: 075
OS_VA1: 20/25
OD_VA2: 20/25+2
OD_ADD: +2.75
OD_AXIS: 070
OD_SPHERE: PLANO

## 2020-08-11 ASSESSMENT — REFRACTION_AUTOREFRACTION
OS_SPHERE: -0.25
OD_CYLINDER: -0.50
OD_AXIS: 026
OD_SPHERE: +0.50

## 2020-08-11 ASSESSMENT — CONFRONTATIONAL VISUAL FIELD TEST (CVF)
OD_FINDINGS: FULL
OS_FINDINGS: FULL

## 2020-08-11 ASSESSMENT — VISUAL ACUITY
OS_BCVA: 20/30-1
OD_BCVA: 20/50+1

## 2020-08-11 ASSESSMENT — PACHYMETRY
OS_CT_CORRECTION: -5
OD_CT_UM: 618
OS_CT_UM: 611
OD_CT_CORRECTION: -5

## 2020-08-11 ASSESSMENT — CORNEAL DYSTROPHY
OS_DYSTROPHY: BAND KERATOPATHY
OD_DYSTROPHY: BAND KERATOPATHY

## 2020-08-11 ASSESSMENT — SPHEQUIV_DERIVED: OD_SPHEQUIV: 0.25

## 2020-10-25 ENCOUNTER — HOSPITAL ENCOUNTER (EMERGENCY)
Facility: HOSPITAL | Age: 85
Discharge: HOME | End: 2020-10-25
Attending: EMERGENCY MEDICINE | Admitting: EMERGENCY MEDICINE
Payer: MEDICARE

## 2020-10-25 VITALS
HEART RATE: 69 BPM | RESPIRATION RATE: 16 BRPM | TEMPERATURE: 97 F | DIASTOLIC BLOOD PRESSURE: 70 MMHG | WEIGHT: 170 LBS | SYSTOLIC BLOOD PRESSURE: 163 MMHG | OXYGEN SATURATION: 99 %

## 2020-10-25 DIAGNOSIS — N39.0 UTI (URINARY TRACT INFECTION): Primary | ICD-10-CM

## 2020-10-25 LAB
ALBUMIN SERPL BCP-MCNC: 3.5 G/DL (ref 3.5–5)
ALP SERPL-CCNC: 82 U/L (ref 46–116)
ALT SERPL W P-5'-P-CCNC: 18 U/L (ref 12–78)
ANION GAP SERPL CALCULATED.3IONS-SCNC: 8 MMOL/L (ref 4–13)
AST SERPL W P-5'-P-CCNC: 14 U/L (ref 5–45)
BACTERIA UR QL AUTO: ABNORMAL /HPF
BASOPHILS # BLD AUTO: 0.02 THOUSANDS/ΜL (ref 0–0.1)
BASOPHILS NFR BLD AUTO: 0 % (ref 0–1)
BILIRUB SERPL-MCNC: 0.39 MG/DL (ref 0.2–1)
BILIRUB UR QL STRIP: NEGATIVE
BUN SERPL-MCNC: 48 MG/DL (ref 5–25)
CALCIUM SERPL-MCNC: 9.6 MG/DL (ref 8.3–10.1)
CHLORIDE SERPL-SCNC: 107 MMOL/L (ref 100–108)
CLARITY UR: ABNORMAL
CO2 SERPL-SCNC: 27 MMOL/L (ref 21–32)
COLOR UR: YELLOW
CREAT SERPL-MCNC: 2.27 MG/DL (ref 0.6–1.3)
EOSINOPHIL # BLD AUTO: 0.04 THOUSAND/ΜL (ref 0–0.61)
EOSINOPHIL NFR BLD AUTO: 0 % (ref 0–6)
ERYTHROCYTE [DISTWIDTH] IN BLOOD BY AUTOMATED COUNT: 13.2 % (ref 11.6–15.1)
GFR SERPL CREATININE-BSD FRML MDRD: 19 ML/MIN/1.73SQ M
GLUCOSE SERPL-MCNC: 85 MG/DL (ref 65–140)
GLUCOSE UR STRIP-MCNC: NEGATIVE MG/DL
HCT VFR BLD AUTO: 32.7 % (ref 34.8–46.1)
HGB BLD-MCNC: 10.3 G/DL (ref 11.5–15.4)
HGB UR QL STRIP.AUTO: ABNORMAL
IMM GRANULOCYTES # BLD AUTO: 0.03 THOUSAND/UL (ref 0–0.2)
IMM GRANULOCYTES NFR BLD AUTO: 0 % (ref 0–2)
KETONES UR STRIP-MCNC: NEGATIVE MG/DL
LACTATE SERPL-SCNC: 1.9 MMOL/L (ref 0.5–2)
LEUKOCYTE ESTERASE UR QL STRIP: ABNORMAL
LYMPHOCYTES # BLD AUTO: 2.28 THOUSANDS/ΜL (ref 0.6–4.47)
LYMPHOCYTES NFR BLD AUTO: 22 % (ref 14–44)
MCH RBC QN AUTO: 31.4 PG (ref 26.8–34.3)
MCHC RBC AUTO-ENTMCNC: 31.5 G/DL (ref 31.4–37.4)
MCV RBC AUTO: 100 FL (ref 82–98)
MONOCYTES # BLD AUTO: 0.88 THOUSAND/ΜL (ref 0.17–1.22)
MONOCYTES NFR BLD AUTO: 9 % (ref 4–12)
NEUTROPHILS # BLD AUTO: 6.92 THOUSANDS/ΜL (ref 1.85–7.62)
NEUTS SEG NFR BLD AUTO: 69 % (ref 43–75)
NITRITE UR QL STRIP: NEGATIVE
NON-SQ EPI CELLS URNS QL MICRO: ABNORMAL /HPF
NRBC BLD AUTO-RTO: 0 /100 WBCS
PH UR STRIP.AUTO: 6 [PH]
PLATELET # BLD AUTO: 143 THOUSANDS/UL (ref 149–390)
PMV BLD AUTO: 12.6 FL (ref 8.9–12.7)
POTASSIUM SERPL-SCNC: 3.7 MMOL/L (ref 3.5–5.3)
PROT SERPL-MCNC: 7.7 G/DL (ref 6.4–8.2)
PROT UR STRIP-MCNC: ABNORMAL MG/DL
RBC # BLD AUTO: 3.28 MILLION/UL (ref 3.81–5.12)
RBC #/AREA URNS AUTO: ABNORMAL /HPF
SODIUM SERPL-SCNC: 142 MMOL/L (ref 136–145)
SP GR UR STRIP.AUTO: 1.01 (ref 1–1.03)
UROBILINOGEN UR QL STRIP.AUTO: 0.2 E.U./DL
WBC # BLD AUTO: 10.17 THOUSAND/UL (ref 4.31–10.16)
WBC #/AREA URNS AUTO: ABNORMAL /HPF

## 2020-10-25 PROCEDURE — 99283 EMERGENCY DEPT VISIT LOW MDM: CPT

## 2020-10-25 PROCEDURE — 87086 URINE CULTURE/COLONY COUNT: CPT | Performed by: PHYSICIAN ASSISTANT

## 2020-10-25 PROCEDURE — 80053 COMPREHEN METABOLIC PANEL: CPT | Performed by: PHYSICIAN ASSISTANT

## 2020-10-25 PROCEDURE — 99285 EMERGENCY DEPT VISIT HI MDM: CPT | Performed by: PHYSICIAN ASSISTANT

## 2020-10-25 PROCEDURE — 96365 THER/PROPH/DIAG IV INF INIT: CPT

## 2020-10-25 PROCEDURE — 87186 SC STD MICRODIL/AGAR DIL: CPT | Performed by: PHYSICIAN ASSISTANT

## 2020-10-25 PROCEDURE — 83605 ASSAY OF LACTIC ACID: CPT | Performed by: PHYSICIAN ASSISTANT

## 2020-10-25 PROCEDURE — 36415 COLL VENOUS BLD VENIPUNCTURE: CPT | Performed by: PHYSICIAN ASSISTANT

## 2020-10-25 PROCEDURE — 85025 COMPLETE CBC W/AUTO DIFF WBC: CPT | Performed by: PHYSICIAN ASSISTANT

## 2020-10-25 PROCEDURE — 81001 URINALYSIS AUTO W/SCOPE: CPT | Performed by: PHYSICIAN ASSISTANT

## 2020-10-25 PROCEDURE — 87077 CULTURE AEROBIC IDENTIFY: CPT | Performed by: PHYSICIAN ASSISTANT

## 2020-10-25 RX ORDER — CEFTRIAXONE 1 G/50ML
1000 INJECTION, SOLUTION INTRAVENOUS ONCE
Status: COMPLETED | OUTPATIENT
Start: 2020-10-25 | End: 2020-10-25

## 2020-10-25 RX ORDER — CEPHALEXIN 500 MG/1
500 CAPSULE ORAL EVERY 6 HOURS SCHEDULED
Qty: 28 CAPSULE | Refills: 0 | Status: SHIPPED | OUTPATIENT
Start: 2020-10-25 | End: 2020-10-25 | Stop reason: SDUPTHER

## 2020-10-25 RX ORDER — CEPHALEXIN 500 MG/1
500 CAPSULE ORAL EVERY 6 HOURS SCHEDULED
Qty: 28 CAPSULE | Refills: 0 | Status: SHIPPED | OUTPATIENT
Start: 2020-10-25 | End: 2020-11-01

## 2020-10-25 RX ADMIN — SODIUM CHLORIDE 500 ML: 0.9 INJECTION, SOLUTION INTRAVENOUS at 13:29

## 2020-10-25 RX ADMIN — CEFTRIAXONE 1000 MG: 1 INJECTION, SOLUTION INTRAVENOUS at 13:34

## 2020-10-27 LAB — BACTERIA UR CULT: ABNORMAL

## 2021-03-17 ENCOUNTER — HOSPITAL ENCOUNTER (OUTPATIENT)
Dept: ULTRASOUND IMAGING | Facility: HOSPITAL | Age: 86
Discharge: HOME | End: 2021-03-17
Attending: OTOLARYNGOLOGY
Payer: MEDICARE

## 2021-03-17 DIAGNOSIS — E04.2 NONTOXIC MULTINODULAR GOITER: ICD-10-CM

## 2021-03-17 PROCEDURE — 76536 US EXAM OF HEAD AND NECK: CPT

## 2021-04-13 ENCOUNTER — DOCTOR'S OFFICE (OUTPATIENT)
Dept: URBAN - NONMETROPOLITAN AREA CLINIC 1 | Facility: CLINIC | Age: 86
Setting detail: OPHTHALMOLOGY
End: 2021-04-13
Payer: COMMERCIAL

## 2021-04-13 VITALS — HEIGHT: 55 IN

## 2021-04-13 DIAGNOSIS — H35.3132: ICD-10-CM

## 2021-04-13 DIAGNOSIS — H40.1132: ICD-10-CM

## 2021-04-13 DIAGNOSIS — Z96.1: ICD-10-CM

## 2021-04-13 DIAGNOSIS — E11.3293: ICD-10-CM

## 2021-04-13 PROCEDURE — 92133 CPTRZD OPH DX IMG PST SGM ON: CPT | Performed by: OPHTHALMOLOGY

## 2021-04-13 PROCEDURE — 92014 COMPRE OPH EXAM EST PT 1/>: CPT | Performed by: OPHTHALMOLOGY

## 2021-04-13 ASSESSMENT — REFRACTION_CURRENTRX
OS_OVR_VA: 20/
OD_CYLINDER: -0.75
OS_ADD: +3.00
OD_SPHERE: PLANO
OD_OVR_VA: 20/
OD_ADD: +3.00
OD_CYLINDER: -0.50
OS_CYLINDER: -*0.75
OD_OVR_VA: 20/
OD_AXIS: 065
OS_VPRISM_DIRECTION: PROGS
OD_AXIS: 070
OS_SPHERE: PLANO
OD_VPRISM_DIRECTION: BF
OS_SPHERE: -0.25
OD_VPRISM_DIRECTION: PROGS
OD_ADD: +2.75
OS_VPRISM_DIRECTION: BF
OS_AXIS: 109
OS_ADD: +2.75
OS_CYLINDER: -0.25
OS_OVR_VA: 20/
OS_AXIS: 075
OD_SPHERE: -0.25

## 2021-04-13 ASSESSMENT — REFRACTION_MANIFEST
OD_CYLINDER: -0.75
OD_SPHERE: PLANO
OS_CYLINDER: -0.75
OD_VA1: 20/25+2
OD_AXIS: 070
OS_AXIS: 075
OS_SPHERE: PLANO
OS_ADD: +2.75
OS_VA2: 20/25
OD_ADD: +2.75
OD_VA2: 20/25+2
OS_VA1: 20/25

## 2021-04-13 ASSESSMENT — PACHYMETRY
OD_CT_UM: 618
OS_CT_UM: 611
OS_CT_CORRECTION: -5
OD_CT_CORRECTION: -5

## 2021-04-13 ASSESSMENT — REFRACTION_AUTOREFRACTION
OS_CYLINDER: -0.25
OS_SPHERE: +0.25
OD_CYLINDER: -0.50
OD_SPHERE: -0.50
OD_AXIS: 38
OS_AXIS: 108

## 2021-04-13 ASSESSMENT — TONOMETRY
OS_IOP_MMHG: 12
OD_IOP_MMHG: 13

## 2021-04-13 ASSESSMENT — CORNEAL DYSTROPHY
OS_DYSTROPHY: BAND KERATOPATHY
OD_DYSTROPHY: BAND KERATOPATHY

## 2021-04-13 ASSESSMENT — SPHEQUIV_DERIVED
OS_SPHEQUIV: 0.125
OD_SPHEQUIV: -0.75

## 2021-04-13 ASSESSMENT — CONFRONTATIONAL VISUAL FIELD TEST (CVF)
OS_FINDINGS: FULL
OD_FINDINGS: FULL

## 2021-04-13 ASSESSMENT — VISUAL ACUITY
OS_BCVA: 20/30
OD_BCVA: 20/60

## 2022-02-07 ENCOUNTER — APPOINTMENT (EMERGENCY)
Dept: MRI IMAGING | Facility: HOSPITAL | Age: 87
End: 2022-02-07
Payer: MEDICARE

## 2022-02-07 ENCOUNTER — APPOINTMENT (EMERGENCY)
Dept: CT IMAGING | Facility: HOSPITAL | Age: 87
End: 2022-02-07
Payer: MEDICARE

## 2022-02-07 ENCOUNTER — HOSPITAL ENCOUNTER (EMERGENCY)
Facility: HOSPITAL | Age: 87
Discharge: HOME | End: 2022-02-08
Attending: EMERGENCY MEDICINE | Admitting: EMERGENCY MEDICINE
Payer: MEDICARE

## 2022-02-07 DIAGNOSIS — S01.81XA LACERATION OF FOREHEAD, INITIAL ENCOUNTER: ICD-10-CM

## 2022-02-07 DIAGNOSIS — S09.90XA INJURY OF HEAD, INITIAL ENCOUNTER: Primary | ICD-10-CM

## 2022-02-07 DIAGNOSIS — S02.2XXA CLOSED FRACTURE OF NASAL BONE, INITIAL ENCOUNTER: ICD-10-CM

## 2022-02-07 PROCEDURE — 70551 MRI BRAIN STEM W/O DYE: CPT

## 2022-02-07 PROCEDURE — 99284 EMERGENCY DEPT VISIT MOD MDM: CPT

## 2022-02-07 PROCEDURE — 12015 RPR F/E/E/N/L/M 7.6-12.5 CM: CPT | Performed by: EMERGENCY MEDICINE

## 2022-02-07 PROCEDURE — 90471 IMMUNIZATION ADMIN: CPT

## 2022-02-07 PROCEDURE — 70486 CT MAXILLOFACIAL W/O DYE: CPT

## 2022-02-07 PROCEDURE — 99284 EMERGENCY DEPT VISIT MOD MDM: CPT | Performed by: EMERGENCY MEDICINE

## 2022-02-07 PROCEDURE — 90715 TDAP VACCINE 7 YRS/> IM: CPT | Performed by: EMERGENCY MEDICINE

## 2022-02-07 PROCEDURE — 70450 CT HEAD/BRAIN W/O DYE: CPT

## 2022-02-07 RX ORDER — AMOXICILLIN AND CLAVULANATE POTASSIUM 875; 125 MG/1; MG/1
1 TABLET, FILM COATED ORAL EVERY 12 HOURS
Qty: 20 TABLET | Refills: 0 | Status: SHIPPED | OUTPATIENT
Start: 2022-02-07 | End: 2022-02-17

## 2022-02-07 RX ORDER — GINSENG 100 MG
1 CAPSULE ORAL ONCE
Status: COMPLETED | OUTPATIENT
Start: 2022-02-07 | End: 2022-02-07

## 2022-02-07 RX ORDER — AMOXICILLIN AND CLAVULANATE POTASSIUM 875; 125 MG/1; MG/1
1 TABLET, FILM COATED ORAL ONCE
Status: COMPLETED | OUTPATIENT
Start: 2022-02-07 | End: 2022-02-07

## 2022-02-07 RX ADMIN — BACITRACIN 1 LARGE APPLICATION: 500 OINTMENT TOPICAL at 20:03

## 2022-02-07 RX ADMIN — TETANUS TOXOID, REDUCED DIPHTHERIA TOXOID AND ACELLULAR PERTUSSIS VACCINE, ADSORBED 0.5 ML: 5; 2.5; 8; 8; 2.5 SUSPENSION INTRAMUSCULAR at 16:41

## 2022-02-07 RX ADMIN — AMOXICILLIN AND CLAVULANATE POTASSIUM 1 TABLET: 875; 125 TABLET, FILM COATED ORAL at 20:02

## 2022-02-07 NOTE — ED PROVIDER NOTES
History  Chief Complaint   Patient presents with   Taylor Fall      states pt was in bed, turned away and heard a thud, pt was on floor, laceration noted to scalp line and nose, bleeding controlled on arrival     Patient is an 78-year-old female brought to the emergency department by EMS secondary to fall from bed with head injury,  reports that he was on the telephone and turned his back for just a moment when he heard a thud, patient had rolled out of bed onto floor, patient has a history of dementia, family attempted to place her in inpatient hospice approximately 1 month ago but she tested positive for COVID, they have not pursued placing her back and hospice since, has been reports that he is able to care for at home with the assistance of their eldest daughter, she has not been sick in recent days, no fever or chills, no vomiting or diarrhea, patient does have decreased p o  Intake and is not compliant with her medications          Prior to Admission Medications   Prescriptions Last Dose Informant Patient Reported? Taking?    Aspirin Buf,CaCarb-MgCarb-MgO, 81 MG TABS Not Taking at Unknown time  Yes No   Sig: Take 81 mg by mouth daily   Patient not taking: Reported on 2/7/2022    Cholecalciferol 25 MCG (1000 UT) tablet Not Taking at Unknown time  Yes No   Sig: Take 1,000 Units by mouth   Patient not taking: Reported on 2/7/2022    amLODIPine (NORVASC) 5 mg tablet Not Taking at Unknown time  Yes No   Sig: Take 5 mg by mouth daily   Patient not taking: Reported on 2/7/2022    calcium carbonate (OS-HAILE) 1250 (500 Ca) MG chewable tablet Not Taking at Unknown time  Yes No   Sig: Chew 1 tablet   Patient not taking: Reported on 2/7/2022    famotidine (PEPCID) 20 mg tablet Not Taking at Unknown time  Yes No   Sig: Take 20 mg by mouth daily   Patient not taking: Reported on 1/2/6221    folic acid (FOLVITE) 1 mg tablet Not Taking at Unknown time  Yes No   Sig: Take 1 mg by mouth daily   Patient not taking: Reported on 2/7/2022    levothyroxine (Synthroid) 50 mcg tablet Not Taking at Unknown time  Yes No   Sig: Take 50 mcg by mouth daily   Patient not taking: Reported on 2/7/2022    memantine (NAMENDA) 10 mg tablet Not Taking at Unknown time  Yes No   Sig: Take 10 mg by mouth 2 (two) times a day   Patient not taking: Reported on 2/7/2022    pravastatin (PRAVACHOL) 40 mg tablet Not Taking at Unknown time  Yes No   Sig: Take 40 mg by mouth daily at bedtime   Patient not taking: Reported on 2/7/2022    sodium bicarbonate 650 mg tablet Not Taking at Unknown time  Yes No   Sig: Take 650 mg by mouth 2 (two) times a day   Patient not taking: Reported on 2/7/2022    vitamin B-12 (VITAMIN B-12) 1,000 mcg tablet Not Taking at Unknown time  Yes No   Sig: Take 1,000 mcg by mouth   Patient not taking: Reported on 2/7/2022       Facility-Administered Medications: None       Past Medical History:   Diagnosis Date    Dementia ([facility] )     Diabetes mellitus ([facility] )     Hypertension     Renal disorder        History reviewed  No pertinent surgical history  History reviewed  No pertinent family history  I have reviewed and agree with the history as documented  E-Cigarette/Vaping    E-Cigarette Use Never User      E-Cigarette/Vaping Substances     Social History     Tobacco Use    Smoking status: Never Smoker    Smokeless tobacco: Never Used   Vaping Use    Vaping Use: Never used   Substance Use Topics    Alcohol use: Never    Drug use: Never       Review of Systems   Constitutional: Negative  HENT: Negative  Eyes: Negative  Respiratory: Negative  Cardiovascular: Negative  Gastrointestinal: Negative  Endocrine: Negative  Genitourinary: Negative  Musculoskeletal: Negative  Skin: Positive for wound  Allergic/Immunologic: Negative  Neurological: Negative  Hematological: Negative  Psychiatric/Behavioral: Negative          Physical Exam  Physical Exam  Constitutional:       Appearance: She is well-developed  HENT:      Head: Normocephalic and atraumatic  Comments: Laceration to mid forehead    Slight nasal deformity with abrasions, no septal hematoma     Right Ear: Tympanic membrane normal       Left Ear: Tympanic membrane normal       Nose: Nose normal       Mouth/Throat:      Mouth: Mucous membranes are dry  Eyes:      Conjunctiva/sclera: Conjunctivae normal       Pupils: Pupils are equal, round, and reactive to light  Cardiovascular:      Rate and Rhythm: Normal rate  Pulmonary:      Effort: Pulmonary effort is normal    Abdominal:      Palpations: Abdomen is soft  Musculoskeletal:         General: Normal range of motion  Cervical back: Normal range of motion and neck supple  Comments: Full passive range of motion of all extremities without evidence of pain, no obvious trauma to extremities   Skin:     General: Skin is warm and dry  Neurological:      Mental Status: She is alert  Mental status is at baseline           Vital Signs  ED Triage Vitals   Temperature Pulse Respirations Blood Pressure SpO2   02/07/22 1628 02/07/22 1628 02/07/22 1628 02/07/22 1635 02/07/22 1628   97 9 °F (36 6 °C) 76 18 148/69 98 %      Temp Source Heart Rate Source Patient Position - Orthostatic VS BP Location FiO2 (%)   02/07/22 1628 02/07/22 1628 -- -- --   Temporal Monitor         Pain Score       --                  Vitals:    02/07/22 1715 02/07/22 1728 02/07/22 1828 02/07/22 1928   BP: 134/92 134/93 130/78 134/80   Pulse: 67 66 68 70         Visual Acuity  Visual Acuity      Most Recent Value   L Pupil Size (mm) 2   R Pupil Size (mm) 2          ED Medications  Medications   tetanus-diphtheria-acellular pertussis (BOOSTRIX) IM injection 0 5 mL (0 5 mL Intramuscular Given 2/7/22 1641)   bacitracin topical ointment 1 large application (1 large application Topical Given 2/7/22 2003)   amoxicillin-clavulanate (AUGMENTIN) 875-125 mg per tablet 1 tablet (1 tablet Oral Given 2/7/22 2002) Diagnostic Studies  Results Reviewed     None                 MRI brain wo contrast   Final Result by Bernardino Walker MD (02/07 1835)      No evidence for acute intracranial hemorrhage or infarction  Punctate focus of susceptibility within the left corona radiata corresponding to high attenuation on recent CT and most consistent with a tiny cavernoma  No surrounding edema to suggest the presence of recent perilesional hemorrhage  Workstation performed: [id number]         CT head without contrast   Final Result by Bernardino Walker MD (02/07 1830)      No definite evidence for acute intracranial hemorrhage or depressed calvarial fracture on the current exam       Punctate focus of high attenuation in the left corona radiata likely represent either a tiny cavernoma or focus of mineralization  Small focus of hemorrhage is unlikely given the location and mechanism of injury  Consider interval follow-up with CT    and/or MRI for further evaluation to ensure stability  I personally discussed this study with Valerie Quintero on 2/7/2022 at 5:25 PM                            Workstation performed: [id number]         CT facial bones without contrast   Final Result by Bernardino Walker MD (02/07 1830)      Acute comminuted bilateral nasal bone fractures  Orbital floors are intact bilaterally  I personally discussed this study with Valerie Quintero on 2/7/2022 at 6:30 PM                   Workstation performed: [id number]                    Procedures  Laceration repair    Date/Time: 2/7/2022 8:18 PM  Performed by: Arla Fabry, DO  Authorized by: Arla Fabry, DO   Consent: Verbal consent obtained    Risks and benefits: risks, benefits and alternatives were discussed  Consent given by: patient and spouse  Required items: required blood products, implants, devices, and special equipment available  Patient identity confirmed: verbally with patient  Body area: head/neck  Location details: forehead  Laceration length: 8 cm  Foreign bodies: no foreign bodies  Tendon involvement: none  Nerve involvement: none  Vascular damage: no  Anesthesia: local infiltration    Anesthesia:  Local Anesthetic: lidocaine 1% without epinephrine  Anesthetic total: 10 mL    Sedation:  Patient sedated: no        Procedure Details:  Preparation: Patient was prepped and draped in the usual sterile fashion  Irrigation solution: saline  Irrigation method: syringe  Amount of cleaning: standard  Debridement: none  Degree of undermining: none  Skin closure: 4-0 Prolene  Number of sutures: 8  Technique: simple and horizontal mattress  Approximation: close  Approximation difficulty: simple  Dressing: antibiotic ointment, gauze roll and 4x4 sterile gauze  Patient tolerance: patient tolerated the procedure well with no immediate complications    Laceration repair    Date/Time: 2/7/2022 8:20 PM  Performed by: Denise Enriquez DO  Authorized by: Denise Enriquez DO   Consent: Verbal consent obtained  Risks and benefits: risks, benefits and alternatives were discussed  Consent given by: patient and spouse  Required items: required blood products, implants, devices, and special equipment available  Patient identity confirmed: arm band  Body area: head/neck  Location details: nose  Laceration length: 1 cm  Foreign bodies: no foreign bodies  Tendon involvement: none  Nerve involvement: none  Vascular damage: no  Anesthesia: local infiltration    Anesthesia:  Local Anesthetic: lidocaine 1% without epinephrine  Anesthetic total: 2 mL    Sedation:  Patient sedated: no        Procedure Details:  Preparation: Patient was prepped and draped in the usual sterile fashion    Irrigation solution: saline  Irrigation method: syringe  Amount of cleaning: standard  Debridement: none  Degree of undermining: none  Skin closure: 4-0 Prolene  Number of sutures: 2  Technique: simple and horizontal mattress  Approximation: close  Approximation difficulty: simple  Dressing: antibiotic ointment and 4x4 sterile gauze  Patient tolerance: patient tolerated the procedure well with no immediate complications    Laceration repair    Date/Time: 2/7/2022 8:22 PM  Performed by: Danielle Carey DO  Authorized by: Danielle Carey DO   Consent: Verbal consent obtained  Risks and benefits: risks, benefits and alternatives were discussed  Consent given by: patient and spouse  Radiology Images displayed and confirmed  If images not available, report reviewed: imaging studies available  Required items: required blood products, implants, devices, and special equipment available  Patient identity confirmed: verbally with patient  Body area: head/neck  Location details: right eyebrow  Laceration length: 1 cm  Foreign bodies: no foreign bodies  Tendon involvement: none  Nerve involvement: none  Vascular damage: no  Anesthesia: local infiltration    Anesthesia:  Local Anesthetic: lidocaine 1% without epinephrine  Anesthetic total: 2 mL    Sedation:  Patient sedated: no        Procedure Details:  Preparation: Patient was prepped and draped in the usual sterile fashion    Irrigation solution: saline  Irrigation method: syringe  Amount of cleaning: standard  Debridement: none  Degree of undermining: none  Skin closure: 4-0 Prolene  Number of sutures: 2  Technique: simple and horizontal mattress  Approximation: close  Approximation difficulty: simple  Dressing: 4x4 sterile gauze and antibiotic ointment  Patient tolerance: patient tolerated the procedure well with no immediate complications               ED Course  ED Course as of 02/07/22 2023   [facility] Feb 07, 2022 2016 Imaging findings show no acute injury except for nasal bone fractures, wounds were cleaned and sutured, dressed with bacitracin and a clean dressing, has been was given wound care instructions as well as instructions for follow-up, patient started on prophylactic antibiotics for nasal bone fracture, advised to return if any additional concerns, I did discuss with  whether not he felt it was safe for him to take patient home, as they were attempting to place patient in inpatient hospice recently, he does report that he and his daughter are able to care for patient at home, they have borrowed a hospital bed from a friend, they generally are able to keep a very close eye on her, and do not feel as though it is necessary to pursue placement at this time                               SBIRT 20yo+      Most Recent Value   SBIRT (24 yo +)    In order to provide better care to our patients, we are screening all of our patients for alcohol and drug use  Would it be okay to ask you these screening questions? Yes Filed at: 02/07/2022 1633   Initial Alcohol Screen: US AUDIT-C     1  How often do you have a drink containing alcohol? 0 Filed at: 02/07/2022 1633   2  How many drinks containing alcohol do you have on a typical day you are drinking? 0 Filed at: 02/07/2022 1633   3a  Male UNDER 65: How often do you have five or more drinks on one occasion? 0 Filed at: 02/07/2022 1633   3b  FEMALE Any Age, or MALE 65+: How often do you have 4 or more drinks on one occassion? 0 Filed at: 02/07/2022 1633   Audit-C Score 0 Filed at: 02/07/2022 1633   CHRISTINE: How many times in the past year have you    Used an illegal drug or used a prescription medication for non-medical reasons?  Never Filed at: 02/07/2022 1633                      Disposition  Final diagnoses:   Injury of head, initial encounter   Laceration of forehead, initial encounter   Closed fracture of nasal bone, initial encounter     Time reflects when diagnosis was documented in both MDM as applicable and the Disposition within this note     Time User Action Codes Description Comment    2/7/2022  7:46 PM Manasa Fraser Add [S09 90XA] Injury of head, initial encounter     2/7/2022  7:46 PM Kourtney Moreau Add [S01 81XA] Laceration of forehead, initial encounter     2/7/2022  7:47 PM Viki Martin Add [S02  2XXA] Closed fracture of nasal bone, initial encounter       ED Disposition     ED Disposition Condition Date/Time Comment    Discharge Stable Mon Feb 7, 2022  7:46 PM Carla Mcpherson discharge to home/self care              Follow-up Information     Follow up With Specialties Details Why Scott Combs 54, DO Family Medicine In 3 days As needed [address] [place] [address]      Eladia Brown MD Otolaryngology In 3 days  [facility]  [address] [facility]  [phone]            Patient's Medications   Discharge Prescriptions    AMOXICILLIN-CLAVULANATE (AUGMENTIN) 875-125 MG PER TABLET    Take 1 tablet by mouth every 12 (twelve) hours for 10 days       Start Date: 2/7/2022  End Date: 2/17/2022       Order Dose: 1 tablet       Quantity: 20 tablet    Refills: 0           PDMP Review     None          ED Provider  Electronically Signed by           Virgil Mccoy DO  02/07/22 2023

## 2022-02-08 VITALS
WEIGHT: 95.24 LBS | OXYGEN SATURATION: 100 % | DIASTOLIC BLOOD PRESSURE: 48 MMHG | RESPIRATION RATE: 20 BRPM | HEART RATE: 58 BPM | TEMPERATURE: 97.9 F | SYSTOLIC BLOOD PRESSURE: 105 MMHG

## 2022-02-08 NOTE — ED NOTES
Spoke with Brianne Corona , pt daughter to let her know no pickup time       [address] RN  02/08/22 2309

## 2022-02-08 NOTE — DISCHARGE INSTRUCTIONS
Gently cleanse wounds twice daily with warm water and soap  Apply antibiotic ointment and a clean dressing  Sutures to be removed in 7-10 days  Follow-up with your PCP for wound check in 3 days  Return to the emergency department if symptoms worsen or any additional concerns